# Patient Record
Sex: MALE | Race: WHITE
[De-identification: names, ages, dates, MRNs, and addresses within clinical notes are randomized per-mention and may not be internally consistent; named-entity substitution may affect disease eponyms.]

---

## 2021-05-03 ENCOUNTER — HOSPITAL ENCOUNTER (EMERGENCY)
Dept: HOSPITAL 4 - SED | Age: 86
Discharge: HOME | End: 2021-05-03
Payer: COMMERCIAL

## 2021-05-03 VITALS — BODY MASS INDEX: 28.32 KG/M2 | WEIGHT: 170 LBS | HEIGHT: 65 IN

## 2021-05-03 VITALS — SYSTOLIC BLOOD PRESSURE: 115 MMHG

## 2021-05-03 VITALS — SYSTOLIC BLOOD PRESSURE: 146 MMHG

## 2021-05-03 DIAGNOSIS — Z79.899: ICD-10-CM

## 2021-05-03 DIAGNOSIS — K59.00: Primary | ICD-10-CM

## 2021-05-03 NOTE — NUR
pt. drove himself here with c/o constipation, states has not had bm x 5 days, 
denies any abd. pain, no N/V, does have rectal pressure but unable to pass 
stool; no other complaints at this time, pt. denies taking any laxatives or 
stool softners.

## 2021-05-03 NOTE — NUR
Patient to ER bed 2 to gown for evaluation. Side rails up.

Report given to NIHARIKA BUSTAMANTE AND NIHARIKA LEMA

## 2021-05-03 NOTE — NUR
Patient given written and verbal discharge instructions and verbalizes 
understanding.  ER MD discussed with patient the results and treatment 
provided. Patient in stable condition. ID arm band removed. 

 Patient educated on pain management and to follow up with PMD. Pain Scale 0.

Opportunity for questions provided and answered. Medication side effect fact 
sheet provided.